# Patient Record
Sex: FEMALE | Race: WHITE | NOT HISPANIC OR LATINO | Employment: OTHER | URBAN - METROPOLITAN AREA
[De-identification: names, ages, dates, MRNs, and addresses within clinical notes are randomized per-mention and may not be internally consistent; named-entity substitution may affect disease eponyms.]

---

## 2017-07-12 ENCOUNTER — HOSPITAL ENCOUNTER (OUTPATIENT)
Dept: MAMMOGRAPHY | Facility: CLINIC | Age: 67
Discharge: HOME | End: 2017-07-12
Attending: OBSTETRICS & GYNECOLOGY | Admitting: OBSTETRICS & GYNECOLOGY
Payer: MEDICARE

## 2017-07-12 DIAGNOSIS — Z12.31 VISIT FOR SCREENING MAMMOGRAM: ICD-10-CM

## 2017-07-12 PROCEDURE — 77063 BREAST TOMOSYNTHESIS BI: CPT

## 2017-07-12 PROCEDURE — G0202 SCR MAMMO BI INCL CAD: HCPCS

## 2018-06-26 ENCOUNTER — TRANSFERRED RECORDS (OUTPATIENT)
Dept: HEALTH INFORMATION MANAGEMENT | Facility: CLINIC | Age: 68
End: 2018-06-26

## 2018-07-25 ENCOUNTER — HOSPITAL ENCOUNTER (OUTPATIENT)
Dept: MAMMOGRAPHY | Facility: CLINIC | Age: 68
Discharge: HOME | End: 2018-07-25
Attending: OBSTETRICS & GYNECOLOGY | Admitting: OBSTETRICS & GYNECOLOGY
Payer: MEDICARE

## 2018-07-25 DIAGNOSIS — Z12.31 VISIT FOR SCREENING MAMMOGRAM: ICD-10-CM

## 2018-07-25 PROCEDURE — 77067 SCR MAMMO BI INCL CAD: CPT

## 2018-08-01 ENCOUNTER — HOSPITAL ENCOUNTER (OUTPATIENT)
Dept: MAMMOGRAPHY | Facility: CLINIC | Age: 68
Discharge: HOME | End: 2018-08-01
Attending: OBSTETRICS & GYNECOLOGY | Admitting: OBSTETRICS & GYNECOLOGY
Payer: MEDICARE

## 2018-08-01 ENCOUNTER — HOSPITAL ENCOUNTER (OUTPATIENT)
Dept: MAMMOGRAPHY | Facility: CLINIC | Age: 68
End: 2018-08-01
Attending: OBSTETRICS & GYNECOLOGY
Payer: MEDICARE

## 2018-08-01 DIAGNOSIS — R92.8 ABNORMAL MAMMOGRAM: ICD-10-CM

## 2018-08-01 PROCEDURE — 25000125 ZZHC RX 250: Performed by: OBSTETRICS & GYNECOLOGY

## 2018-08-01 PROCEDURE — 76642 ULTRASOUND BREAST LIMITED: CPT | Mod: RT

## 2018-08-01 PROCEDURE — 88305 TISSUE EXAM BY PATHOLOGIST: CPT | Performed by: OBSTETRICS & GYNECOLOGY

## 2018-08-01 PROCEDURE — 27210206 US BREAST BIOPSY CORE NEEDLE RIGHT

## 2018-08-01 PROCEDURE — 40000986 MA POST PROCEDURE RIGHT

## 2018-08-01 PROCEDURE — 88305 TISSUE EXAM BY PATHOLOGIST: CPT | Mod: 26 | Performed by: OBSTETRICS & GYNECOLOGY

## 2018-08-01 RX ADMIN — LIDOCAINE HYDROCHLORIDE 5 ML: 10 INJECTION, SOLUTION INFILTRATION; PERINEURAL at 15:03

## 2018-08-01 NOTE — DISCHARGE INSTRUCTIONS
After Your Biopsy  Bleeding or Bruising: Slight bruising is normal. If you bleed through the bandage, put direct pressure on the site for 20 minutes. If you are still bleeding after 20 minutes, call the provider who ordered the exam. If it is after hours please go to the emergency room for further evaluation.    Bandages: Keep your bandage in place until tomorrow morning. Do not get it wet. On the second day, you may remove the bandage. Keep steri strips in place, they will fall off on their own.     Activity: You may shower the morning after the exam. No heavy activity (lifting, vacuuming) for 24 hours.     Discomfort: You may take tylenol for pain. You may also apply an ice pack over biopsy site for 15-20 minutes several times per day. Wear supportive bra for 24 hours following biopsy.    Infection: Infection is rare. Symptoms may include fever, redness, increasing pain, and/or fluid draining from biopsy site. If you have any of these symptoms, please call the provider who ordered your exam.    Results: Results may take up to 3 business days.     Call the provider who ordered your exam if: you have bleeding more than 20 minutes, you have uncontrolled pain, or you have signs of an infection.    Please call one of our nurses if you have questions or concerns after your biopsy: Ana [phone] Soledad [phone] or Julisa WHALEN [phone]    Thank you for choosing [facility].

## 2018-08-02 LAB — COPATH REPORT: NORMAL

## 2018-08-03 NOTE — PROGRESS NOTES
After review by Breast Center Radiologist, Dr. Bill Cortes, Ms. Gilliam was notified of her 8/1/2018 Right Breast Biopsy pathology results (No atypia or malignancy) and Follow up Recommendation (Breast MRI).  Jessie denies any post biopsy site issues.  I explained to Jessie that after review of the imaging and biopsy results, Dr. Cortes is concerned that this result may be discordant and would like her to have a breast MRI that would further evaluate the area seen on Mammogram.  Jessie is in agreement with this plan.  I have contacted Dr. Kern's office.  They will be faxing an order for the Breast MRI and central scheduling will then call Jessie to arrange the Breast MRI.    Kamala Mccall RN, BSN  [facility] [place]  [phone]

## 2018-08-08 ENCOUNTER — HOSPITAL ENCOUNTER (OUTPATIENT)
Dept: MRI IMAGING | Facility: CLINIC | Age: 68
Discharge: HOME | End: 2018-08-08
Attending: OBSTETRICS & GYNECOLOGY | Admitting: OBSTETRICS & GYNECOLOGY
Payer: MEDICARE

## 2018-08-08 DIAGNOSIS — R92.8 ABNORMAL MAMMOGRAM: ICD-10-CM

## 2018-08-08 PROCEDURE — 25000128 H RX IP 250 OP 636: Performed by: OBSTETRICS & GYNECOLOGY

## 2018-08-08 PROCEDURE — A9585 GADOBUTROL INJECTION: HCPCS | Performed by: OBSTETRICS & GYNECOLOGY

## 2018-08-08 PROCEDURE — 77059 MR BREAST BILATERAL W/O & W CONTRAST: CPT

## 2018-08-08 RX ORDER — GADOBUTROL 604.72 MG/ML
6 INJECTION INTRAVENOUS ONCE
Status: COMPLETED | OUTPATIENT
Start: 2018-08-08 | End: 2018-08-08

## 2018-08-08 RX ADMIN — GADOBUTROL 6 ML: 604.72 INJECTION INTRAVENOUS at 15:42

## 2018-08-09 ENCOUNTER — TELEPHONE (OUTPATIENT)
Dept: MAMMOGRAPHY | Facility: CLINIC | Age: 68
End: 2018-08-09

## 2018-08-09 NOTE — TELEPHONE ENCOUNTER
"Ms. Gilliam was called and given her 8/08/2018 Breast MRI Results (See Below).  The radiologist is recommending a Tomosynthesis (3D) guided biopsy to resolve a \"mildly suspicious finding\" in the RIGHT Breast.  [facility] has the 3D  Breast biopsy equipment.  I have explained the MRI result to Jessie and the [facility] Nurse will call her to schedule the biopsy. Jessie is in agreement with this plan.    FINDINGS:   Right breast: There is mild background parenchymal enhancement. There  is moderate enhancement at the biopsy site at the 10 o'clock position.  Artifact from the biopsy marker is identified in this location. These  findings are typical of postbiopsy change. There is a subtle area of  architectural distortion posterior to the biopsy site in the upper  outer quadrant. This likely correlates with the architectural  distortion that was identified on tomosynthesis. This area  demonstrates a small amount of rapid enhancement. There is no  additional suspicious contrast enhancement in the right breast. No  enlarged right axillary lymph nodes.     Left breast: Mild background parenchymal enhancement. There is no  abnormal contrast enhancement or other suspicious finding. There are  no enlarged left axillary lymph nodes.         IMPRESSION: BI-RADS CATEGORY: 4 - Suspicious Abnormality-Biopsy Should  Be Considered.  1. Persistent architectural distortion in the upper outer right  breast, posterior to the location of the biopsy. This remains a mildly  suspicious finding. Recommend tomosynthesis guided biopsy.  2. No suspicious finding in the left breast.     RECOMMENDED FOLLOW-UP: Biopsy  MD Kamala BENTON RN BSN  [facility]  [phone]  "

## 2018-08-14 ENCOUNTER — RADIANT APPOINTMENT (OUTPATIENT)
Dept: MAMMOGRAPHY | Facility: CLINIC | Age: 68
End: 2018-08-14
Attending: OBSTETRICS & GYNECOLOGY
Payer: COMMERCIAL

## 2018-08-14 DIAGNOSIS — R92.8 ABNORMAL MAMMOGRAM: ICD-10-CM

## 2018-08-14 PROCEDURE — 19081 BX BREAST 1ST LESION STRTCTC: CPT | Mod: RT | Performed by: RADIOLOGY

## 2018-08-14 PROCEDURE — 88305 TISSUE EXAM BY PATHOLOGIST: CPT | Performed by: RADIOLOGY

## 2018-08-14 RX ORDER — LIDOCAINE HYDROCHLORIDE AND EPINEPHRINE 10; 10 MG/ML; UG/ML
16 INJECTION, SOLUTION INFILTRATION; PERINEURAL ONCE
Status: COMPLETED | OUTPATIENT
Start: 2018-08-14 | End: 2018-08-14

## 2018-08-14 RX ADMIN — LIDOCAINE HYDROCHLORIDE AND EPINEPHRINE 16 ML: 10; 10 INJECTION, SOLUTION INFILTRATION; PERINEURAL at 14:26

## 2018-08-14 RX ADMIN — Medication 1 MEQ: at 14:24

## 2018-08-17 ENCOUNTER — TELEPHONE (OUTPATIENT)
Dept: GENERAL RADIOLOGY | Facility: CLINIC | Age: 68
End: 2018-08-17

## 2018-08-17 DIAGNOSIS — R92.8 ABNORMAL MAMMOGRAM: Primary | ICD-10-CM

## 2018-08-17 LAB — COPATH REPORT: NORMAL

## 2018-08-17 NOTE — TELEPHONE ENCOUNTER
Spoke with patient regarding right breast biopsy results, which indicate benign post-menopausal breast tissue with benign calcifications. Notified pt that the radiologist recommendation is a six month follow-up right breast diagnostic mammogram. Pt verbalized understanding of these results and all questions answered to her satisfaction.

## 2019-06-26 ENCOUNTER — ANCILLARY PROCEDURE (OUTPATIENT)
Dept: MAMMOGRAPHY | Facility: CLINIC | Age: 69
End: 2019-06-26
Attending: OBSTETRICS & GYNECOLOGY
Payer: COMMERCIAL

## 2019-06-26 DIAGNOSIS — R92.8 ABNORMAL MAMMOGRAM: ICD-10-CM

## 2019-06-26 PROCEDURE — 77066 DX MAMMO INCL CAD BI: CPT | Performed by: STUDENT IN AN ORGANIZED HEALTH CARE EDUCATION/TRAINING PROGRAM

## 2019-06-26 PROCEDURE — G0279 TOMOSYNTHESIS, MAMMO: HCPCS | Performed by: STUDENT IN AN ORGANIZED HEALTH CARE EDUCATION/TRAINING PROGRAM

## 2019-12-19 ENCOUNTER — TRANSFERRED RECORDS (OUTPATIENT)
Dept: HEALTH INFORMATION MANAGEMENT | Facility: CLINIC | Age: 69
End: 2019-12-19

## 2019-12-19 LAB
HPV ABSTRACT: NORMAL
PAP-ABSTRACT: NORMAL

## 2020-07-22 ENCOUNTER — ANCILLARY PROCEDURE (OUTPATIENT)
Dept: MAMMOGRAPHY | Facility: CLINIC | Age: 70
End: 2020-07-22
Attending: FAMILY MEDICINE
Payer: COMMERCIAL

## 2020-07-22 DIAGNOSIS — Z12.31 VISIT FOR SCREENING MAMMOGRAM: ICD-10-CM

## 2020-07-22 PROCEDURE — 77063 BREAST TOMOSYNTHESIS BI: CPT

## 2020-07-22 PROCEDURE — 77067 SCR MAMMO BI INCL CAD: CPT

## 2020-10-14 ENCOUNTER — OFFICE VISIT (OUTPATIENT)
Dept: OBGYN | Facility: CLINIC | Age: 70
End: 2020-10-14
Payer: COMMERCIAL

## 2020-10-14 VITALS
HEART RATE: 61 BPM | WEIGHT: 152.2 LBS | SYSTOLIC BLOOD PRESSURE: 130 MMHG | DIASTOLIC BLOOD PRESSURE: 79 MMHG | OXYGEN SATURATION: 99 %

## 2020-10-14 DIAGNOSIS — E55.9 VITAMIN D DEFICIENCY: ICD-10-CM

## 2020-10-14 DIAGNOSIS — Z13.1 SCREENING FOR DIABETES MELLITUS: ICD-10-CM

## 2020-10-14 DIAGNOSIS — Z12.31 ENCOUNTER FOR SCREENING MAMMOGRAM FOR BREAST CANCER: Primary | ICD-10-CM

## 2020-10-14 DIAGNOSIS — Z13.29 SCREENING FOR THYROID DISORDER: ICD-10-CM

## 2020-10-14 DIAGNOSIS — Z85.828 HISTORY OF SKIN CANCER: ICD-10-CM

## 2020-10-14 DIAGNOSIS — Z13.220 LIPID SCREENING: ICD-10-CM

## 2020-10-14 DIAGNOSIS — Z80.3 FAMILY HISTORY OF MALIGNANT NEOPLASM OF BREAST: ICD-10-CM

## 2020-10-14 PROCEDURE — 99387 INIT PM E/M NEW PAT 65+ YRS: CPT | Performed by: OBSTETRICS & GYNECOLOGY

## 2020-10-14 RX ORDER — DIAPER,BRIEF,INFANT-TODD,DISP
EACH MISCELLANEOUS 2 TIMES DAILY
COMMUNITY

## 2020-10-14 RX ORDER — TRIAMCINOLONE ACETONIDE 0.25 MG/G
OINTMENT TOPICAL 2 TIMES DAILY
COMMUNITY

## 2020-10-14 RX ORDER — FLUOCINOLONE ACETONIDE 0.11 MG/ML
OIL TOPICAL 2 TIMES DAILY
COMMUNITY

## 2020-10-14 RX ORDER — BETAMETHASONE DIPROPIONATE 0.05 %
OINTMENT (GRAM) TOPICAL 2 TIMES DAILY
COMMUNITY

## 2020-10-14 ASSESSMENT — PAIN SCALES - GENERAL: PAINLEVEL: NO PAIN (0)

## 2020-10-14 NOTE — PATIENT INSTRUCTIONS
If you have any questions regarding your visit, Please contact your care team.    [facility] [facility]: [phone]      [facility] HOURS TELEPHONE NUMBER   Claribel Aguilera DO.    Salima -  Shayy -       MARLEE Montilla, RN  Evelin, RN     Monday, Wednesday, Thursday and [facility]  8:30a.m-5:00 p.m   [facility]  [address] [place] [address]  [phone] ask for [facility]    Imaging Ecezmsumne-[phone]       Urgent Care locations:    [facility] Saturday and Sunday   9 am - 5 pm    Monday-Friday   12 pm - 8 pm  Saturday and Sunday   9 am - 5 pm   [phone]     [facility] Labor and Delivery:  [phone]    If you need a medication refill, please contact your pharmacy. Please allow 3 business days for your refill to be completed.  As always, Thank you for trusting us with your healthcare needs!

## 2020-10-14 NOTE — PROGRESS NOTES
"Jessie is a 70 year old female, , s/p tubal ligation, who is here for her annual exam and is new to the [place] g[facility] dept.  She is postmenopausal and denies any vaginal spotting or bleeding hx since her last menses in her early 60s.  She is updated on her mammogram and colonoscopy but would like a yearly mammogram due to her mother's hx of breast cancer at age 90.  Her mother was also diagnosed with uterine cancer in her 70s.  The patient is considering a referral to the genetic counselor because of her mother's cancer hx (ie breast).  The patient had SCC from her back excised in 2020 so was advised to have a yearly body skin check.  She states that her migraines have resolved since she is no longer having a menses.  She had an \"abnormal\" pap more than 5 years followed by 3 normal pap smears so no longer needs a pap smear since > age 65.  The patient states that she tested + for HSV per a blood test so has questions regarding this since she has never noted any ulcerations/lesions.    ROS: Ten point review of systems was reviewed and negative except the above.    Health Maintenance   Topic Date Due     HEPATITIS C SCREENING  1950     ADVANCE CARE PLANNING  1950     COLORECTAL CANCER SCREENING  1962     LIPID  1997     MEDICARE ANNUAL WELLNESS VISIT  2017     FALL RISK ASSESSMENT  2017     PHQ-2  2020     MAMMO SCREENING  2022     DTAP/TDAP/TD IMMUNIZATION (3 - Td) 2026     DEXA  Completed     INFLUENZA VACCINE  Completed     Pneumococcal Vaccine: 65+ Years  Completed     ZOSTER IMMUNIZATION  Completed     Pneumococcal Vaccine: Pediatrics (0 to 5 Years) and At-Risk Patients (6 to 64 Years)  Aged Out     IPV IMMUNIZATION  Aged Out     MENINGITIS IMMUNIZATION  Aged Out     HEPATITIS B IMMUNIZATION  Aged Out      Last pap: not applicable  Last Mammogram: due in 2021 so the future order was placed  Last Dexa: not due  Last Colonoscopy: due in August " 2027  No results found for: CHOL  No results found for: HDL  No results found for: LDL  No results found for: TRIG  No results found for: CHOLHDLRATIO    OBHX:      PSH:   Past Surgical History:   Procedure Laterality Date     C LIGATE FALLOPIAN TUBE  1990     ENDOMETRIAL SAMPLING (BIOPSY)       KNEE SURGERY       TUBAL LIGATION       PMH: Her past medical, surgical, and obstetric histories were reviewed and are documented in their appropriate chart areas.    ALL/Meds: Her medication and allergy histories were reviewed and are documented in their appropriate chart areas.    SH/FMH: Her social and family history was reviewed and documented in its appropriate chart area.    PE: /79 (BP Location: Left arm, Patient Position: Chair, Cuff Size: Adult Regular)   Pulse 61   Wt 69 kg (152 lb 3.2 oz)   LMP  (Exact Date)   SpO2 99%   Breastfeeding No   There is no height or weight on file to calculate BMI.    General Appearance:  healthy, alert, active, no distress  Cardiovascular:  Regular rate and Rhythm without murmur  Neck: Supple, no adenopathy and thyroid normal  Lungs:  Clear, without wheeze, rale or rhonchi  Breast: normal breast exam  Abdomen: Benign, Soft, flat, non-tender, No masses, organomegaly, No inguinal nodes and Bowel sounds normoactive.   Pelvic:       - Ext: Vulva and perineum are normal without lesion, mass or discharge        - Urethra: normal without discharge        - Urethral Meatus: normal appearance       - Bladder: no tenderness, no masses       - Vagina: Normal mucosa, no discharge        - Cervix: normal and multiparous       - Uterus:Normal shape, position and consistency        - Adnexa: Non palpable       - Rectal: sphincter tone normal and no mass    A/P:  Well Woman Exam, Hx of Squamous Cell Cancer (on her back), Family Hx of Breast and Uterine CA (mother), HSV Hx     -  I discussed the new pap recommendations regarding screening.  Explained the rationale for increased intervals  between paps.  Questions asked and answered.  She does agree to this regiment.  Pap was not obtained since no longer needed after age 65   -  BC: tubal ligation and postmenopausal   -  Check future labwork when in a fasting state - vit D, TSH/free T4, lipid profile, and glulcose   -  Due for next mammogram in July 2021   -  Due for next colonoscopy in August 2027   -  Refer to Derm for an annual skin check due to personal hx of SCC on her back   -  Refer to the genetic counselor if the patient would like this  Orders Placed This Encounter   Procedures     *MA Screening Digital Bilateral     TSH with free T4 reflex     Glucose     Lipid Profile (Chol, Trig, HDL, LDL calc)     Vitamin D Deficiency     DERMATOLOGY ADULT REFERRAL      -  Encouraged self-breast exam   -  Encouraged low fat diet, regular exercise, and adequate calcium intake.    Claribel Aguilera DO  FACOG, FACS

## 2020-10-16 ENCOUNTER — TELEPHONE (OUTPATIENT)
Dept: OBGYN | Facility: CLINIC | Age: 70
End: 2020-10-16

## 2020-10-16 NOTE — LETTER
"    October 16, 2020       TO: Jessie Gilliam  [address]   [place] [address]         Dear Ms. Gilliam,    Our records indicate that you have not scheduled an appointment for a consult, as recommended by Dr. Claribel Aguilera. If you wish to schedule within ealth, we have several options to help you schedule your appointment:      Call [phone]    Visit our website at [email or web address] and click \"I Want To\" (in upper right) and select Request an Appointment    Request an appointment via Mobile[employer]eda at [email or web address]     If you have chosen to schedule elsewhere or if you have already made an appointment, please disregard this letter.    If you have any questions or concerns regarding the information above, please contact the [facility] at [phone].      Sincerely,      [facility] [place]                  "

## 2020-10-16 NOTE — TELEPHONE ENCOUNTER
Oncology/Surgical Oncology Referral Request:     Specialty Requested: Medical Oncology     Referring Provider: Charmaine Aguilera DO    Referring Clinic/Organization: [facility]    Records location: [facility]     Requested Provider (if specified): Not Specified       Called pt x 2, LVM's. Sending Letter.

## 2021-08-31 ENCOUNTER — ANCILLARY PROCEDURE (OUTPATIENT)
Dept: MAMMOGRAPHY | Facility: CLINIC | Age: 71
End: 2021-08-31
Attending: OBSTETRICS & GYNECOLOGY
Payer: COMMERCIAL

## 2021-08-31 DIAGNOSIS — Z12.31 ENCOUNTER FOR SCREENING MAMMOGRAM FOR BREAST CANCER: ICD-10-CM

## 2021-08-31 PROCEDURE — 77067 SCR MAMMO BI INCL CAD: CPT | Mod: GC | Performed by: RADIOLOGY

## 2021-08-31 PROCEDURE — 77063 BREAST TOMOSYNTHESIS BI: CPT | Mod: GC | Performed by: RADIOLOGY

## 2021-10-13 ENCOUNTER — OFFICE VISIT (OUTPATIENT)
Dept: OBGYN | Facility: CLINIC | Age: 71
End: 2021-10-13
Payer: COMMERCIAL

## 2021-10-13 VITALS — WEIGHT: 150.4 LBS | DIASTOLIC BLOOD PRESSURE: 80 MMHG | SYSTOLIC BLOOD PRESSURE: 135 MMHG | HEART RATE: 69 BPM

## 2021-10-13 DIAGNOSIS — E55.9 VITAMIN D DEFICIENCY: ICD-10-CM

## 2021-10-13 DIAGNOSIS — Z85.828 HISTORY OF SKIN CANCER: ICD-10-CM

## 2021-10-13 DIAGNOSIS — Z12.31 ENCOUNTER FOR SCREENING MAMMOGRAM FOR BREAST CANCER: ICD-10-CM

## 2021-10-13 DIAGNOSIS — Z13.1 SCREENING FOR DIABETES MELLITUS: ICD-10-CM

## 2021-10-13 DIAGNOSIS — Z13.29 SCREENING FOR THYROID DISORDER: ICD-10-CM

## 2021-10-13 DIAGNOSIS — E28.39 ESTROGEN DEFICIENCY: ICD-10-CM

## 2021-10-13 DIAGNOSIS — Z00.00 ANNUAL PHYSICAL EXAM: Primary | ICD-10-CM

## 2021-10-13 DIAGNOSIS — Z12.4 SCREENING FOR CERVICAL CANCER: ICD-10-CM

## 2021-10-13 PROCEDURE — 99397 PER PM REEVAL EST PAT 65+ YR: CPT | Performed by: OBSTETRICS & GYNECOLOGY

## 2021-10-13 PROCEDURE — 87624 HPV HI-RISK TYP POOLED RSLT: CPT | Performed by: OBSTETRICS & GYNECOLOGY

## 2021-10-13 PROCEDURE — G0145 SCR C/V CYTO,THINLAYER,RESCR: HCPCS | Performed by: OBSTETRICS & GYNECOLOGY

## 2021-10-13 NOTE — PROGRESS NOTES
"Jessie is a 71 year old postmenopausal female, , s/p tubal ligation, who is here for her annual exam.  She just saw Dermatology yesterday due to a hx of SCC of a back lesion.  She also recently updated her mammogram in 2021 so needs a future order for next year's mammogram.  She denies any postmenopausal spotting or bleeding issues.  She is not in a fasting state so will return for needed labwork.  Her next colonoscopy is due in  per the patient.  She states that she had a lipid test recently performed at [facility] as well as received a flu vaccine there.    ROS: Ten point review of systems was reviewed and negative except the above.    Health Maintenance   Topic Date Due     ADVANCE CARE PLANNING  Never done     COLORECTAL CANCER SCREENING  Never done     HEPATITIS C SCREENING  Never done     LIPID  Never done     FALL RISK ASSESSMENT  Never done     PHQ-2  Never done     MEDICARE ANNUAL WELLNESS VISIT  10/14/2021     MAMMO SCREENING  2023     DTAP/TDAP/TD IMMUNIZATION (3 - Td or Tdap) 2026     DEXA  2033     INFLUENZA VACCINE  Completed     Pneumococcal Vaccine: 65+ Years  Completed     ZOSTER IMMUNIZATION  Completed     COVID-19 Vaccine  Completed     IPV IMMUNIZATION  Aged Out     MENINGITIS IMMUNIZATION  Aged Out     HEPATITIS B IMMUNIZATION  Aged Out      Last pap: not needed after age 65 but the patient requests one today for \"peace of mind\"  Last Mammogram: due 2022  Last Dexa: due  Last Colonoscopy: due in  per pt  No results found for: CHOL  No results found for: HDL  No results found for: LDL  No results found for: TRIG  No results found for: CHOLHDLRATIO    OBHX:      PSH:   Past Surgical History:   Procedure Laterality Date     C LIGATE FALLOPIAN TUBE       ENDOMETRIAL SAMPLING (BIOPSY)       KNEE SURGERY       TUBAL LIGATION       PMH: Her past medical, surgical, and obstetric histories were reviewed and are documented in their appropriate chart " areas.    ALL/Meds: Her medication and allergy histories were reviewed and are documented in their appropriate chart areas.    SH/FMH: Her social and family history was reviewed and documented in its appropriate chart area.    PE: /80   Pulse 69   Wt 68.2 kg (150 lb 6.4 oz)   LMP 08/17/2006   There is no height or weight on file to calculate BMI.    General Appearance:  healthy, alert, active, no distress  Cardiovascular:  Regular rate and Rhythm without murmur  Neck: Supple, no adenopathy and thyroid normal  Lungs:  Clear, without wheeze, rale or rhonchi  Breast: normal breast exam  Abdomen: Benign, Soft, flat, non-tender, No masses, organomegaly, No inguinal nodes and Bowel sounds normoactive.   Pelvic:       - Ext: Vulva and perineum are normal without lesion, mass or discharge        - Urethra: normal without discharge        - Urethral Meatus: normal appearance       - Bladder: no tenderness, no masses       - Vagina: Normal mucosa, no discharge       - Cervix: normal and atrophic       - Uterus:Normal shape, position and consistency and Nontender        - Adnexa: Non palpable       - Rectal: sphincter tone normal and no mass    A/P:  Well Woman Exam, Hx of SCC (skin lesion of back)     -  I discussed the new pap recommendations regarding screening.  Explained the rationale for increased intervals between paps.  Questions asked and answered.  She does not agree to this regiment.  Pap was collected and submitted   -  BC: not applicable   -  Future orders placed for fasting labwork - glucose, TSH/free T4, and vitamin D   -  Overdue for DEXA since she is at increased risk due to her estrogen deficient state   -  Due for mammogram in 8/2022   -  Due for colonoscopy in 2027   -  Due for next Derm check in 10/2022  Orders Placed This Encounter   Procedures     *MA Screening Digital Bilateral     DX Hip/Pelvis/Spine     TSH with free T4 reflex     Glucose     Vitamin D Deficiency     Adult Dermatology  Referral      -  Encouraged self-breast exam   -  Encouraged low fat diet, regular exercise, and adequate calcium intake.    Claribel Aguilera DO  FACOG, FACS

## 2021-10-13 NOTE — PATIENT INSTRUCTIONS
If you have any questions regarding your visit, Please contact your care team.    [place] [facility]: [phone]      [facility] CLINIC HOURS TELEPHONE NUMBER   Claribel Aguilera DO.    Salima -  Shayy -     MARLEE Montilla RN     Monday, Wednesday, [facility]  8:30a.m-5:00 p.m   [facility]  [address] [place] [address]  [phone] ask for [facility]    Imaging [phone]       Urgent Care locations:    [facility]   Monday-Friday   10 am - 8 pm  Saturday and Sunday   9 am - 5 pm    Monday-Friday   10 am - 8 pm  Saturday and Sunday   9 am - 5 pm   [phone]     [facility] Labor and Delivery:  [phone]    If you need a medication refill, please contact your pharmacy. Please allow 3 business days for your refill to be completed.  As always, Thank you for trusting us with your healthcare needs!

## 2021-10-17 LAB
BKR LAB AP GYN ADEQUACY: NORMAL
BKR LAB AP GYN INTERPRETATION: NORMAL
BKR LAB AP HPV REFLEX: NORMAL
BKR LAB AP PREVIOUS ABNORMAL: NORMAL
PATH REPORT.COMMENTS IMP SPEC: NORMAL
PATH REPORT.RELEVANT HX SPEC: NORMAL

## 2021-10-18 ENCOUNTER — LAB (OUTPATIENT)
Dept: LAB | Facility: CLINIC | Age: 71
End: 2021-10-18
Payer: COMMERCIAL

## 2021-10-18 ENCOUNTER — ANCILLARY PROCEDURE (OUTPATIENT)
Dept: BONE DENSITY | Facility: CLINIC | Age: 71
End: 2021-10-18
Attending: OBSTETRICS & GYNECOLOGY
Payer: COMMERCIAL

## 2021-10-18 DIAGNOSIS — E55.9 VITAMIN D DEFICIENCY: ICD-10-CM

## 2021-10-18 DIAGNOSIS — Z13.29 SCREENING FOR THYROID DISORDER: ICD-10-CM

## 2021-10-18 DIAGNOSIS — Z13.1 SCREENING FOR DIABETES MELLITUS: ICD-10-CM

## 2021-10-18 DIAGNOSIS — E28.39 ESTROGEN DEFICIENCY: ICD-10-CM

## 2021-10-18 DIAGNOSIS — Z13.220 LIPID SCREENING: ICD-10-CM

## 2021-10-18 LAB
CHOLEST SERPL-MCNC: 215 MG/DL
FASTING STATUS PATIENT QL REPORTED: YES
FASTING STATUS PATIENT QL REPORTED: YES
GLUCOSE BLD-MCNC: 97 MG/DL (ref 70–99)
HDLC SERPL-MCNC: 77 MG/DL
LDLC SERPL CALC-MCNC: 119 MG/DL
NONHDLC SERPL-MCNC: 138 MG/DL
TRIGL SERPL-MCNC: 95 MG/DL
TSH SERPL DL<=0.005 MIU/L-ACNC: 1.65 MU/L (ref 0.4–4)

## 2021-10-18 PROCEDURE — 84443 ASSAY THYROID STIM HORMONE: CPT

## 2021-10-18 PROCEDURE — 77080 DXA BONE DENSITY AXIAL: CPT | Performed by: RADIOLOGY

## 2021-10-18 PROCEDURE — 80061 LIPID PANEL: CPT

## 2021-10-18 PROCEDURE — 82947 ASSAY GLUCOSE BLOOD QUANT: CPT

## 2021-10-18 PROCEDURE — 36415 COLL VENOUS BLD VENIPUNCTURE: CPT

## 2021-10-18 PROCEDURE — 82306 VITAMIN D 25 HYDROXY: CPT

## 2021-10-19 LAB
DEPRECATED CALCIDIOL+CALCIFEROL SERPL-MC: 68 UG/L (ref 20–75)
HUMAN PAPILLOMA VIRUS 16 DNA: NEGATIVE
HUMAN PAPILLOMA VIRUS 18 DNA: NEGATIVE
HUMAN PAPILLOMA VIRUS FINAL DIAGNOSIS: NORMAL
HUMAN PAPILLOMA VIRUS OTHER HR: NEGATIVE

## 2022-07-01 ENCOUNTER — PATIENT OUTREACH (OUTPATIENT)
Dept: OBGYN | Facility: CLINIC | Age: 72
End: 2022-07-01

## 2022-07-01 NOTE — TELEPHONE ENCOUNTER
Patient Quality Outreach    Patient is due for the following:   Colon Cancer Screening -  Colonoscopy    NEXT STEPS:   Schedule Colonoscopy    Type of outreach:    Sent Codacy message.      Questions for provider review:    None     Zuly Sandy LPN

## 2022-09-08 ENCOUNTER — ANCILLARY PROCEDURE (OUTPATIENT)
Dept: MAMMOGRAPHY | Facility: CLINIC | Age: 72
End: 2022-09-08
Attending: OBSTETRICS & GYNECOLOGY
Payer: COMMERCIAL

## 2022-09-08 DIAGNOSIS — Z12.31 ENCOUNTER FOR SCREENING MAMMOGRAM FOR BREAST CANCER: ICD-10-CM

## 2022-09-08 PROCEDURE — 77067 SCR MAMMO BI INCL CAD: CPT | Mod: GC | Performed by: RADIOLOGY

## 2022-09-08 PROCEDURE — 77063 BREAST TOMOSYNTHESIS BI: CPT | Mod: GC | Performed by: RADIOLOGY

## 2022-09-12 ENCOUNTER — TRANSCRIBE ORDERS (OUTPATIENT)
Dept: OTHER | Age: 72
End: 2022-09-12

## 2022-09-12 ENCOUNTER — PATIENT OUTREACH (OUTPATIENT)
Dept: ONCOLOGY | Facility: CLINIC | Age: 72
End: 2022-09-12

## 2022-09-12 DIAGNOSIS — Z80.49 FAMILY HISTORY OF UTERINE CANCER: Primary | ICD-10-CM

## 2022-09-12 DIAGNOSIS — Z80.3 FAMILY HISTORY OF BREAST CANCER IN MOTHER: ICD-10-CM

## 2022-10-09 ENCOUNTER — HEALTH MAINTENANCE LETTER (OUTPATIENT)
Age: 72
End: 2022-10-09

## 2022-10-10 ENCOUNTER — OFFICE VISIT (OUTPATIENT)
Dept: OBGYN | Facility: CLINIC | Age: 72
End: 2022-10-10
Payer: COMMERCIAL

## 2022-10-10 VITALS
WEIGHT: 153 LBS | SYSTOLIC BLOOD PRESSURE: 151 MMHG | HEART RATE: 68 BPM | OXYGEN SATURATION: 96 % | DIASTOLIC BLOOD PRESSURE: 81 MMHG

## 2022-10-10 DIAGNOSIS — N39.490 OVERFLOW INCONTINENCE OF URINE: Primary | ICD-10-CM

## 2022-10-10 PROCEDURE — 99214 OFFICE O/P EST MOD 30 MIN: CPT | Performed by: OBSTETRICS & GYNECOLOGY

## 2022-10-10 NOTE — PATIENT INSTRUCTIONS
If you have any questions regarding your visit, Please contact your care team.    [employer]: [phone]    To Schedule an Appointment 24/7  [facility]: [phone]   Claribel Aguilera DO.    ALFRED Caruso -Surgery Scheduler  Shayy - Surgery Scheduler    Eladia, MARLEE Olivas, RN  Evelin, MARLEE   [place]  Wednesday and Friday  8:30 a.m-5:00 p.m    [place]  Monday 8:30 a.m-5:00 p.m  Typical Surgery day:  [facility]  [address] [place] [address] [phone] Phone  [phone] Fax    Imaging Scheduling-All Locations [phone]    [facility]  [address] [place] [address]     Urgent Care locations:  [facility] Monday-Friday   10 am - 8 pm  Saturday and Sunday   9 am - 5 pm [phone]   **Surgeries** Our Surgery Schedulers will contact you to schedule. If you do not receive a call within 3 business days, please call [phone].    [facility] Labor and Delivery:  [phone]    If you need a medication refill, please contact your pharmacy. Please allow 3 business days for your refill to be completed.  As always, Thank you for trusting us with your healthcare needs!  see additional instructions from your care team below

## 2022-10-10 NOTE — PROGRESS NOTES
This 73 y/o postmenopausal female, , s/p tubal ligation, presents today c/o a small vaginal cyst that she first felt about 2 months ago while evacuating her rectum through the vaginal sidewall due to constipation.  She states that she usually drinks a special shake daily to avoid constipation but had skipped this for several days so then had this issue.  She denies rectal incontinence but does experience overflow urinary incontinence about twice per month.  She states that she will sense urgency to urinate but will then not be able to make it and void a large amount which saturates her clothing.  Her mother had uterine cancer so she had questions regarding this.  She denies any hx of vaginal spotting or bleeding since menopause.  She denies any hx of diabetes.  BP (!) 151/81 (BP Location: Left arm, Patient Position: Sitting, Cuff Size: Adult Regular)   Pulse 68   Wt 69.4 kg (153 lb)   LMP 2006   SpO2 96%   ROS:  10 systems were reviewed and the positives were listed under problems.  In the dorsal lithotomy position, a bi-valve speculum was placed and her cervix and vaginal mucosa were inspected.  Hymenal tags were noted to be more pronounced on her right side but no cyst or growth were noted.  Her pelvic exam was negative except for a cystocele and rectocele.  Assessment - Overflow urinary incontinence, episodes of constipation, patient's sense of a vaginal cyst  Plan - A referral to Urology for the incontinence issue was advised for follow up.  She declines treatment for the rectocele.  She was advised to return for an annual exam which is due after 10/13/2022.  She already had a mammogram in September and her next colonoscopy is due in 2027.  A repeat DEXA scan is advised in 10/2023 due to a hx of osteopenia.  30 minutes were spent today in chart review, the patient visit, review of tests, and documentation in regard to the issues noted above.

## 2022-10-17 ENCOUNTER — OFFICE VISIT (OUTPATIENT)
Dept: UROLOGY | Facility: CLINIC | Age: 72
End: 2022-10-17
Payer: COMMERCIAL

## 2022-10-17 DIAGNOSIS — N39.41 URGE INCONTINENCE: Primary | ICD-10-CM

## 2022-10-17 DIAGNOSIS — N81.6 RECTOCELE: ICD-10-CM

## 2022-10-17 DIAGNOSIS — N81.89 PELVIC FLOOR WEAKNESS: ICD-10-CM

## 2022-10-17 DIAGNOSIS — N39.3 FEMALE STRESS INCONTINENCE: ICD-10-CM

## 2022-10-17 DIAGNOSIS — N95.2 VAGINAL ATROPHY: ICD-10-CM

## 2022-10-17 LAB
ALBUMIN SERPL-MCNC: 4 G/DL (ref 3.4–5)
ALBUMIN UR-MCNC: NEGATIVE MG/DL
ANION GAP SERPL CALCULATED.3IONS-SCNC: 5 MMOL/L (ref 3–14)
APPEARANCE UR: CLEAR
BILIRUB UR QL STRIP: NEGATIVE
BUN SERPL-MCNC: 23 MG/DL (ref 7–30)
CALCIUM SERPL-MCNC: 9.1 MG/DL (ref 8.5–10.1)
CHLORIDE BLD-SCNC: 106 MMOL/L (ref 94–109)
CO2 SERPL-SCNC: 30 MMOL/L (ref 20–32)
COLOR UR AUTO: COLORLESS
CREAT SERPL-MCNC: 0.8 MG/DL (ref 0.52–1.04)
GFR SERPL CREATININE-BSD FRML MDRD: 79 ML/MIN/1.73M2
GLUCOSE BLD-MCNC: 104 MG/DL (ref 70–99)
GLUCOSE UR STRIP-MCNC: NEGATIVE MG/DL
HGB UR QL STRIP: NEGATIVE
KETONES UR STRIP-MCNC: NEGATIVE MG/DL
LEUKOCYTE ESTERASE UR QL STRIP: NEGATIVE
NITRATE UR QL: NEGATIVE
PH UR STRIP: 6 [PH] (ref 5–7)
PHOSPHATE SERPL-MCNC: 4.2 MG/DL (ref 2.5–4.5)
POTASSIUM BLD-SCNC: 3.8 MMOL/L (ref 3.4–5.3)
SKIP: NORMAL
SODIUM SERPL-SCNC: 141 MMOL/L (ref 133–144)
SP GR UR STRIP: 1.01 (ref 1–1.03)
UROBILINOGEN UR STRIP-MCNC: NORMAL MG/DL

## 2022-10-17 PROCEDURE — 81003 URINALYSIS AUTO W/O SCOPE: CPT | Performed by: PHYSICIAN ASSISTANT

## 2022-10-17 PROCEDURE — 80069 RENAL FUNCTION PANEL: CPT | Performed by: PHYSICIAN ASSISTANT

## 2022-10-17 PROCEDURE — 51798 US URINE CAPACITY MEASURE: CPT | Performed by: PHYSICIAN ASSISTANT

## 2022-10-17 PROCEDURE — 99203 OFFICE O/P NEW LOW 30 MIN: CPT | Mod: 25 | Performed by: PHYSICIAN ASSISTANT

## 2022-10-17 PROCEDURE — 36415 COLL VENOUS BLD VENIPUNCTURE: CPT | Performed by: PHYSICIAN ASSISTANT

## 2022-10-17 NOTE — PATIENT INSTRUCTIONS
- referral to pelvic floor PT placed.  - Plan to follow-up 6 months after starting your physical therapy.  - Will obtain a renal panel today to check your kidney function.   - Recommend voiding every 2-3 hours during the day  - Recommend avoiding fluids before bed.  - Consider estrogen cream in the future.   - consider medications for you urge incontinence in the future such as:  Antimuscarinics: Potential side effects include but not limited to dry eyes, dry mouth, constipation and possible memory loss issues. Advise you to contact our clinic if develop any side effects to the medication. Would clear with your cardiologist and eye doctor first.  Myrbetriq: can help with urinary urgency/frequency and urge incontinence. Some potential risks of Myrbetriq include but not limited to headache, nasal congestion and increase in blood pressure. Advise you to monitor your blood pressure at home while taking Myrbetriq, and to contact our clinic if develop elevated blood pressures. Studies show that changes in blood pressure are typically not clinically significant (per UpToDate). Recommend notifying your your cardiologist before starting medication    _______________________  Below is a list of things that can irritate the bladder and should be eliminated:    Caffeinated soft drinks.  Coffee.  Tea.  Chocolate.  Tomato-based foods.  Acidic juices and fruits. (includes cranberry juice)  Alcohol.  Nicotine  Carbonated drinks.  Aspartame/Nutrasweet.    ________________________    Locations for Pelvic Floor Physical Therapy: they will call you to schedule the first appointment    [facility]   [facility]   [facility] [place]   [facility])   [facility]  [facility] Rehabilitation services - [facility] [place]   [facility]   [facility]   [facility] Pediatric [facility] - U  [facility] - [facility]    _________________________________________________

## 2022-10-17 NOTE — PROGRESS NOTES
Urology Clinic    Name: Jessie Gilliam    MRN: [id number]   YOB: 1950  Accompanied at today's visit by:self                Assessment and Plan:   72 year old female with RAMYA, urinary urgency, pelvic floor weakness, vaginal atrophy, rectocele.    - PVR WNL.  - UA negative.  - Discussed behavioral modifications as well as advising patient to avoid bladder irritants such as coffee.  - Referred to PFPT for RAMYA and pelvic floor weakness.   - Discussed medications for UUI, however patient would like to hold off on this and do some more research on medications. Patient does not have a recent creatinine. Will obtain a renal panel to ensure has good renal function, incase patient changes her mind and would like to trial a medication in the near future.  - Not interested in estrogen cream.   - Continue to monitor rectocele as asymptomatic.   - Patient leaves for [place] next month and won't be back until April 2023 and will start PFPT once she is back. Advised patient to f/u 6 after she starts PT.     Orders Placed This Encounter   Procedures     UA reflex to Microscopic and Culture     Renal panel     Physical Therapy Referral       After discussing the assessment and plan with patient, patient verbalized understanding and agreed to the above plan. All questions answered.     35minutes were spent today on the date of the encounter in reviewing the EMR, direct patient care, coordination of care and documentation in addition to reviewing [facility] records[facility]labs, ordering PFPT and exam.     Danni Joiner PA-C  October 17, 2022    Patient Care Team:  Carl Cheung as PCP - General (Family Medicine)  Carl Cheung Judith Marie, MD as MD (OB/Gyn)  Claribel Aguilera DO as Assigned OBGYN Provider  Abbie Marino GC as Genetic Counselor (Genetic Counselor, MS)  CLARIBEL AGUILERA          Chief Complaint:   Urinary incontinence           History of Present Illness:   October 17,      HISTORY: Jessie Gilliam is a 72 year old female as a new consultation for concerns of urinary incontinence. Referred by Dr. Aguilera. Patient reports voiding 6x/day and 0-1x/night.  Describes UUI a couple times per month. States they are large leaks. Also reports post void dribbling. May have EFREN if bladder is full and laughing or coughing really hard; is mostly bothered by UUI symptoms. Does not wear pads. Denies history of sleep apnea.. Feels like they empty completely after voiding. Drinks 3 large cups of coffee per day. Denies gross hematuria or kidney stones. Reports history of UTIs in the past but since she started taking a cranberry supplement several years ago has not had any UTIs since. Denies s/s of UTI today. Denies prolapse symptoms. Is sexually active. Denies pain with intercourse.  Describes constipation. Reports she has been keeping her bowels regular with a protein shake. Hx of tubal ligation. Recently followed by OB/GYN for small vaginal cyst, but no cyst was found on exam except for hymenal tags.  Former smoker for about 10 years; quit a long time ago. Patient voices no other concerns at this time.            Past Medical History:     Past Medical History:   Diagnosis Date     Cellulitis 2022    face, cheek - [facility] patient     Migraine without aura, with intractable migraine, so stated, without mention of status migrainosus      Skin cancer             Past Surgical History:     Past Surgical History:   Procedure Laterality Date     ENDOMETRIAL SAMPLING (BIOPSY)       KNEE SURGERY       TUBAL LIGATION       ZZC LIGATE FALLOPIAN TUBE              Social History:     Social History     Tobacco Use     Smoking status: Former     Types: Cigarettes     Quit date: 2003     Years since quittin.3     Smokeless tobacco: Never   Substance Use Topics     Alcohol use: Yes     Comment: occasional            Family History:  "    Family History   Problem Relation Age of Onset     C.A.D. Maternal Grandmother          age 70     Cancer Mother         uteran     Diabetes Maternal Uncle      Breast Cancer No family hx of      Prostate Cancer No family hx of               Allergies:     Allergies   Allergen Reactions     Hydromorphone Difficulty breathing and Shortness Of Breath     ? Allergy? Happened after 3rd dose IV  ? Allergy? Happened after 3rd dose IV       Suture Other (See Comments)     \"body does not absorb\" -- pt states they start with a \"M\" -- vicryl sutures and monocryl sutures per pt, states she gets a \"Cellulitis\" type reaction            Medications:     Current Outpatient Medications   Medication Sig     Ascorbic Acid (VITAMIN C PO)      betamethasone dipropionate (DIPROSONE) 0.05 % external ointment Apply topically 2 times daily     Cholecalciferol 10 MCG (400 UNIT) CAPS Take 1 capsule by mouth Vitamin d3     Cranberry Juice Extract 1000 MG CAPS      fluocinolone acetonide (DERMA SMOOTHE/FS BODY) 0.01 % external oil Apply topically 2 times daily     GLUCOSAMINE-CHONDROITIN PO      hydrocortisone (CORTAID) 0.5 % external cream Apply topically 2 times daily     Multiple Vitamins-Minerals (ZINC PO)      Omega-3 Fatty Acids (FISH OIL PO)      Plant Sterols and Stanols (CHOLESTOFF PO)      triamcinolone (KENALOG) 0.025 % external ointment Apply topically 2 times daily     UNABLE TO FIND MEDICATION NAME: tumeric     UNABLE TO FIND MEDICATION NAME: Susanestefania     No current facility-administered medications for this visit.             Review of Systems:    ROS: 14 point ROS neg other than the symptoms noted above in the HPI.          Physical Exam:   Last menstrual period 2006, not currently breastfeeding.  Data Unavailable, There is no height or weight on file to calculate BMI., 0 lbs 0 oz  Gen appearance: Age-appropriate appearing female in NAD.   HEENT:  EOMI, conjunctiva clear/white. Normal ROM of neck for age. "   Psych:  alert , In no acute distress.  Neuro:  A&Ox3  Skin:  Clear of obvious rashes, ecchymoses.  Resp:  Normal respiratory effort; not in acute respiratory distress.   Vasc:  Regular rate.  lymph:  No obvious LE edema bilaterally.     exam:  Vulva is normal in appearance. Urethral caruncle noted.. Negative for EFREN with reduction of speculum when instructed to cough however does have some mobility to the urethra with cough. Vaginal mucosa atrophic. No pain to palpation on internal exam or external exam. Do notice some hypertonicity along the right levator ani muscles.  Rectocele grade 2.. Strength 1/5. No obvious masses, lesions, ulcers, bleeding noted on internal or external exam.          Data:    PVR  13mL    Labs:    UA RESULTS:  Recent Labs   Lab Test 10/17/22  1533   COLOR Colorless   APPEARANCE Clear   URINEGLC Negative   URINEBILI Negative   URINEKETONE Negative   SG 1.008   UBLD Negative   URINEPH 6.0   PROTEIN Negative   NITRITE Negative   LEUKEST Negative     Lab on 10/18/2021   Component Date Value Ref Range Status     TSH 10/18/2021 1.65  0.40 - 4.00 mU/L Final     Glucose 10/18/2021 97  70 - 99 mg/dL Final     Patient Fasting > 8hrs? 10/18/2021 Yes   Final     Cholesterol 10/18/2021 215 (H)  <200 mg/dL Final     Triglycerides 10/18/2021 95  <150 mg/dL Final     Direct Measure HDL 10/18/2021 77  >=50 mg/dL Final     LDL Cholesterol Calculated 10/18/2021 119 (H)  <=100 mg/dL Final     Non HDL Cholesterol 10/18/2021 138 (H)  <130 mg/dL Final     Patient Fasting > 8hrs? 10/18/2021 Yes   Final     Vitamin D, Total (25-Hydroxy) 10/18/2021 68  20 - 75 ug/L Final     Outside records:  I spent 2 minutes reviewing outside records.

## 2022-10-17 NOTE — NURSING NOTE
Jessie Gilliam's goals for this visit include:   Chief Complaint   Patient presents with     New Patient     Incontinence        She requests these members of her care team be copied on today's visit information:     post void residual: 13ml    PCP: Carl Cheung    Referring Provider:  Claribel Aguilera DO  [id number] [place] [address]    LMP 08/17/2006     Do you need any medication refills at today's visit?     Ese Hinds LPN on 10/17/2022 at 3:41 PM

## 2022-11-26 ENCOUNTER — HEALTH MAINTENANCE LETTER (OUTPATIENT)
Age: 72
End: 2022-11-26

## 2023-05-15 ENCOUNTER — THERAPY VISIT (OUTPATIENT)
Dept: PHYSICAL THERAPY | Facility: CLINIC | Age: 73
End: 2023-05-15
Attending: PHYSICIAN ASSISTANT
Payer: COMMERCIAL

## 2023-05-15 DIAGNOSIS — N39.41 URGE INCONTINENCE: ICD-10-CM

## 2023-05-15 DIAGNOSIS — N39.3 FEMALE STRESS INCONTINENCE: ICD-10-CM

## 2023-05-15 DIAGNOSIS — N81.89 PELVIC FLOOR WEAKNESS IN FEMALE: ICD-10-CM

## 2023-05-15 DIAGNOSIS — N39.46 MIXED INCONTINENCE: ICD-10-CM

## 2023-05-15 PROCEDURE — 97161 PT EVAL LOW COMPLEX 20 MIN: CPT | Mod: GP | Performed by: PHYSICAL THERAPIST

## 2023-05-15 PROCEDURE — 97530 THERAPEUTIC ACTIVITIES: CPT | Mod: GP | Performed by: PHYSICAL THERAPIST

## 2023-05-15 PROCEDURE — 97140 MANUAL THERAPY 1/> REGIONS: CPT | Mod: GP | Performed by: PHYSICAL THERAPIST

## 2023-05-15 PROCEDURE — 97112 NEUROMUSCULAR REEDUCATION: CPT | Mod: GP | Performed by: PHYSICAL THERAPIST

## 2023-05-15 NOTE — PROGRESS NOTES
Physical Therapy Initial Evaluation  Subjective:  The history is provided by the patient and medical records. No  was used.   Patient Health History  Jessie Gilliam being seen for bladder.     Date of Onset: many yrs ago.   Problem occurred: unsure   Pain is reported as 7/10 (L side very painful, puts pressure on it) on pain scale.  General health as reported by patient is good.  Pertinent medical history includes: hepatitis and other (brain hemangioma).   Red flags:  None as reported by patient.   Other medical allergies details: see epic.    Other surgery history details: 2 TKA, R shoulder, heart ablation.        Current occupation is retired.      Other job/home tasks details: home tasks.                Therapist Generated HPI Evaluation  Problem details: Date of MD order for this episode was 10-17-22. Rosalie was in [place] from Nov to April and will start PT now. Rosalie c/o bladder issues she has had for several years.   Rosalie states sometimes she waits too long to go to the bathroom and she will leak as she is not able to get to the toilet fast enough. No pad use. She will change her underwear and pants about 1x/wk due to leakage from hard cough or laugh.  Urge/urge incontinence- happens about 1x or more every couple weeks, triggers are running water. Void times during day about every 2 hrs. Gets up about 2x at night to void. Unable to stop the flow of urine. Does not fully empty, has PVD.   Fluids-16 oz coffee, 64 oz, 2x/wk seltzer water with alcohol  Bowel- daily 1-2x, type 2-4 on bristol stool scale, states if she has smoothie with hemp, trinh and flax seeds this helps. She will take miralax if she skips a day(1x/wk)  Diet-healthy diet with fiber  Pain-some pain with intercourse(entering).  Exercise-mostly walking(daily, ave 2 miles), some weights  Rosalie gave permission for intravaginal exam today  .         Type of problem:  Incontinence and other (mixed, dyspareunia).    This is a  chronic condition.  Condition occurred with:  Insidious onset.  Where condition occurred: for unknown reasons.  Patient reports pain:  Vaginal (intermittent at opening).  Pain is described as aching and is intermittent.  Pain is the same all the time.  Since onset symptoms are gradually worsening.  Symptoms are exacerbated by other, intercourse, coughing and laughing (water running)  and relieved by nothing.      Work activity restrictions: retired.  Barriers include:  None as reported by patient.                        Objective:  System                                 Pelvic Dysfunction Evaluation:        Flexibility:    Tightness present at:Iliopsoas  Tightness not present at:  Adductors; Hamstrings; Piriformis or Gluteals    Abdominal Wall:  Abdominal wall pelvic: good diaphragmatic breather, mild suprapubic tightness, tight R LQ.          SI Provocation:    Positive for: ASLR  Negative for:  SI Compression and Fabres        External Assessment:  External assessment pelvic: lacks correct PFM activation subst with heavy glute max contraction/lifts hips,  L adductor activation.  Skin Condition:  Atrophic      Tissue Symmetry:  Normal  Introitus:  Normal  Muscle Contraction/Perineal Mobility:  Substitution  Internal Assessment:  Internal assessment pelvic: very minimal PFM contraction with verbal and tactile cues, very low endurance for a contraction. Needed verbal and tactile cues for correct mm activation but also still used glute max and adductors.  Sensory Exam:  Normal  Contraction/Grade:  Fllicker muscles stretched (1)    Accessory Muscle use-Gluteals:  X  Accessory Muscle use-Adductors:  X    Additional History:  Delivery History:  Vaginal delivery  Number of Pregnancies: 2  Number of Live Births: 2  Caffeine Consumption:  16 oz coffee          Hip Evaluation    Hip Strength:    Flexion:   Left: 5/5   Pain:  Right: 5/5   Pain:                    Extension:  Left: 3+/5  Pain:Right: 3+/5    Pain:    Abduction:   Left: 4+/5     Pain:Right: 4-/5    Pain:    Internal Rotation:  Left: 5/5    Pain:Right: 5/5   Pain:  External Rotation:  Left: 5/5   Pain:  Right: 5/5   Pain:                           General     ROS    Assessment/Plan:    Patient is a 73 year old female with pelvic complaints.    Patient has the following significant findings with corresponding treatment plan.                Diagnosis 1:  Mixed incontinence, pelvic floor weakness  Pain -  manual therapy, self management, education and home program  Decreased ROM/flexibility - manual therapy, therapeutic exercise and home program  Decreased strength - therapeutic exercise, therapeutic activities and home program  Decreased proprioception - neuro re-education, therapeutic activities and home program  Impaired muscle performance - biofeedback, neuro re-education and home program  Decreased function - therapeutic activities and home program    Therapy Evaluation Codes:   1) History comprised of:   Personal factors that impact the plan of care:      Age and Time since onset of symptoms.    Comorbidity factors that impact the plan of care are:      None.     Medications impacting care: None.  2) Examination of Body Systems comprised of:   Body structures and functions that impact the plan of care:      Pelvis.   Activity limitations that impact the plan of care are:      [place], Stress incontinence, Urgency and Urge incontinence.  3) Clinical presentation characteristics are:   Stable/Uncomplicated.  4) Decision-Making    Low complexity using standardized patient assessment instrument and/or measureable assessment of functional outcome.  Cumulative Therapy Evaluation is: Low complexity.    Previous and current functional limitations:  (See Goal Flow Sheet for this information)    Short term and Long term goals: (See Goal Flow Sheet for this information)     Communication ability:  Patient appears to be able to clearly communicate and understand verbal and written  communication and follow directions correctly.  Treatment Explanation - The following has been discussed with the patient:   RX ordered/plan of care  Anticipated outcomes  Possible risks and side effects  This patient would benefit from PT intervention to resume normal activities.   Rehab potential is good.    Frequency:  1 X week, once daily  Duration:  for 2 weeks tapering to 2 X a month over 2 months   Discharge Plan:  Achieve all LTG.  Independent in home treatment program.  Reach maximal therapeutic benefit.    Please refer to the daily flowsheet for treatment today, total treatment time and time spent performing 1:1 timed codes.

## 2023-05-16 NOTE — PROGRESS NOTES
[facility]    OUTPATIENT Physical Therapy ORTHOPEDIC EVALUATION  PLAN OF TREATMENT FOR OUTPATIENT REHABILITATION  (COMPLETE FOR INITIAL CLAIMS ONLY)  Patient's Last Name, First Name, M.I.  YOB: 1950  aminataJessie  REEMA    Provider s Name:  [facility]   Medical Record No.  [id number]   Start of Care Date:  05/15/23   Onset Date:   10/17/22 (date of MD order)   Treatment Diagnosis:  mixed incontinence, pelvic floor weakness Medical Diagnosis:     Urge incontinence  Female stress incontinence  Mixed incontinence  Pelvic floor weakness in female       Goals:     05/15/23 0700   Urinary Leakage   Previous Functional Level No problems  (prior 2005)   Current Functional Level Leaking with   Performance Level hard cough/laugh, underwear/pants change about 1x/wk, urge leaks about 2x/mo or more when hears water running  (overall she needs to track as not fully sure of timing)   STG Target Performance Decrease urinary leakage episodes in a month to   Performance Level 2   Rationale continence throughout the day   Due Date 06/12/23   LTG Target Performance Decrease urinary leakage episodes in a month to   Performance Level 0 due to stress or uge   Rationale continence throughout the day   Due Date 07/24/23         Therapy Frequency:  1x/wk for 2 wks tapering to 2x/mo for 2 mos  Predicted Duration of Therapy Intervention:  10 wks    Erin Ferreira, PT                 I CERTIFY THE NEED FOR THESE SERVICES FURNISHED UNDER        THIS PLAN OF TREATMENT AND WHILE UNDER MY CARE     (Physician attestation of this document indicates review and certification of the therapy plan).                     Certification Date From:  05/15/23   Certification Date To:  07/24/23    Referring Provider:  Danni Joiner    Initial Assessment        See Epic Evaluation SOC Date: 05/15/23

## 2023-05-22 ENCOUNTER — THERAPY VISIT (OUTPATIENT)
Dept: PHYSICAL THERAPY | Facility: CLINIC | Age: 73
End: 2023-05-22
Payer: COMMERCIAL

## 2023-05-22 DIAGNOSIS — N39.46 MIXED INCONTINENCE: Primary | ICD-10-CM

## 2023-05-22 DIAGNOSIS — N81.89 PELVIC FLOOR WEAKNESS IN FEMALE: ICD-10-CM

## 2023-05-22 PROCEDURE — 97112 NEUROMUSCULAR REEDUCATION: CPT | Mod: GP | Performed by: PHYSICAL THERAPIST

## 2023-05-22 PROCEDURE — 97110 THERAPEUTIC EXERCISES: CPT | Mod: GP | Performed by: PHYSICAL THERAPIST

## 2023-06-05 ENCOUNTER — THERAPY VISIT (OUTPATIENT)
Dept: PHYSICAL THERAPY | Facility: CLINIC | Age: 73
End: 2023-06-05
Payer: COMMERCIAL

## 2023-06-05 DIAGNOSIS — N39.46 MIXED INCONTINENCE: Primary | ICD-10-CM

## 2023-06-05 DIAGNOSIS — N81.89 PELVIC FLOOR WEAKNESS IN FEMALE: ICD-10-CM

## 2023-06-05 PROCEDURE — 97110 THERAPEUTIC EXERCISES: CPT | Mod: GP | Performed by: PHYSICAL THERAPIST

## 2023-06-05 PROCEDURE — 97112 NEUROMUSCULAR REEDUCATION: CPT | Mod: GP | Performed by: PHYSICAL THERAPIST

## 2023-09-11 ENCOUNTER — ANCILLARY PROCEDURE (OUTPATIENT)
Dept: MAMMOGRAPHY | Facility: CLINIC | Age: 73
End: 2023-09-11
Attending: FAMILY MEDICINE
Payer: COMMERCIAL

## 2023-09-11 DIAGNOSIS — Z12.31 VISIT FOR SCREENING MAMMOGRAM: ICD-10-CM

## 2023-09-11 PROCEDURE — 77063 BREAST TOMOSYNTHESIS BI: CPT | Mod: GC | Performed by: STUDENT IN AN ORGANIZED HEALTH CARE EDUCATION/TRAINING PROGRAM

## 2023-09-11 PROCEDURE — 77067 SCR MAMMO BI INCL CAD: CPT | Mod: GC | Performed by: STUDENT IN AN ORGANIZED HEALTH CARE EDUCATION/TRAINING PROGRAM

## 2023-10-04 PROBLEM — N39.46 MIXED INCONTINENCE: Status: RESOLVED | Noted: 2023-05-15 | Resolved: 2023-10-04

## 2023-10-04 PROBLEM — N81.89 PELVIC FLOOR WEAKNESS IN FEMALE: Status: RESOLVED | Noted: 2023-05-15 | Resolved: 2023-10-04

## 2024-01-06 ENCOUNTER — HEALTH MAINTENANCE LETTER (OUTPATIENT)
Age: 74
End: 2024-01-06

## 2024-09-13 ENCOUNTER — ANCILLARY PROCEDURE (OUTPATIENT)
Dept: MAMMOGRAPHY | Facility: CLINIC | Age: 74
End: 2024-09-13
Attending: FAMILY MEDICINE
Payer: COMMERCIAL

## 2024-09-13 DIAGNOSIS — Z12.31 VISIT FOR SCREENING MAMMOGRAM: ICD-10-CM

## 2024-09-13 PROCEDURE — 77063 BREAST TOMOSYNTHESIS BI: CPT | Mod: GC | Performed by: RADIOLOGY

## 2024-09-13 PROCEDURE — 77067 SCR MAMMO BI INCL CAD: CPT | Mod: GC | Performed by: RADIOLOGY

## 2025-01-25 ENCOUNTER — HEALTH MAINTENANCE LETTER (OUTPATIENT)
Age: 75
End: 2025-01-25